# Patient Record
(demographics unavailable — no encounter records)

---

## 2025-05-05 NOTE — REVIEW OF SYSTEMS
[Negative] : Heme/Lymph [FreeTextEntry3] : History of elevated intraocular pressures. [FreeTextEntry5] : Hypercholesterolemia [FreeTextEntry7] : NKDA [de-identified] : Osteoarthritis [de-identified] : Spinal stenosis [de-identified] : Hypothyroid

## 2025-05-05 NOTE — REVIEW OF SYSTEMS
[Negative] : Heme/Lymph [FreeTextEntry3] : History of elevated intraocular pressures. [FreeTextEntry5] : Hypercholesterolemia [FreeTextEntry7] : NKDA [de-identified] : Osteoarthritis [de-identified] : Spinal stenosis [de-identified] : Hypothyroid

## 2025-05-05 NOTE — HISTORY OF PRESENT ILLNESS
[de-identified] : 84-year-old lady.  Tyrer-Cuzick risk score = 0.4.  Annual breast examination.  We have followed her for periodic breast examinations since 2007.    She has no signs or symptoms related to either breast.   + Prior personal history: September 2007: Excision of an enlarging right breast fibroadenoma Initial core biopsy was July 2006. No other procedures related to either breast.   No personal history of malignancy.   No relatives with breast OR ovarian cancer.  Not Ashkenazi  Family history of malignancy: Maternal aunt: Esophageal cancer in her late 50s. Maternal grandmother: Metastatic cancer of unknown primary. A sister had lung cancer. Brother had a malignancy, metastatic, from unknown primary.   Her menarche was at age 13.  She had 2 pregnancies, both delivered, first at age 18. Natural menopause at 50. No HRT.   Internist:  Dr. Paul LOMBARDI Prior physician retired: Cristóbal Snider.  NKDA.    She does not have a pacemaker or defibrillator. She takes no anticoagulants.  She takes simvastatin for hypercholesterolemia.  + Elevated intraocular pressure. Uses timolol eyedrops. Ophthalmology (every 6 months) Dr. Timothy RAMEY.  She takes levothyroxine for hypothyroidism. Does not see an endocrinologist.   GYN:  Dr. Anshu CARL, March 2024 visit was unremarkable.   She's had an exacerbation of her spinal stenosis, successfully treated with epidural injections by Dr. Florentin Bueno (pain management). She is planned to follow-up with him for management of worsening arthritis in her right knee (orthopedist is Dr. Naidu and does not think she needs joint replacement therapy at this time). She may try acupuncture.   colonoscopy: No longer necessary. She is >75 years old. She is asymptomatic that any previous personal or family history of CRC. Unknown

## 2025-05-05 NOTE — PHYSICAL EXAM
[Normal] : supple, no neck mass and thyroid not enlarged [Normal Neck Lymph Nodes] : normal neck lymph nodes  [Normal Supraclavicular Lymph Nodes] : normal supraclavicular lymph nodes [Normal Axillary Lymph Nodes] : normal axillary lymph nodes [Normal] : normal appearance, no rash, nodules, vesicles, ulcers, erythema [de-identified] : Groins not examined [de-identified] : below

## 2025-05-05 NOTE — PHYSICAL EXAM
[Normal] : supple, no neck mass and thyroid not enlarged [Normal Neck Lymph Nodes] : normal neck lymph nodes  [Normal Supraclavicular Lymph Nodes] : normal supraclavicular lymph nodes [Normal Axillary Lymph Nodes] : normal axillary lymph nodes [Normal] : normal appearance, no rash, nodules, vesicles, ulcers, erythema [de-identified] : Groins not examined [de-identified] : below

## 2025-05-05 NOTE — ASSESSMENT
[FreeTextEntry1] : 84-year-old lady.  Annual breast examination.  Tyrer-Cuzick risk score = 0.4%.  Clinically doing well.  0/17/25: Bilateral mammogram and sonogram at 450: BI-RADS 2. Prescriptions entered for April 2026.  If asymptomatic, continue yearly breast imaging, an annual follow-up examination.

## 2025-05-05 NOTE — HISTORY OF PRESENT ILLNESS
[de-identified] : 84-year-old lady.  Tyrer-Cuzick risk score = 0.4.  Annual breast examination.  We have followed her for periodic breast examinations since 2007.    She has no signs or symptoms related to either breast.   + Prior personal history: September 2007: Excision of an enlarging right breast fibroadenoma Initial core biopsy was July 2006. No other procedures related to either breast.   No personal history of malignancy.   No relatives with breast OR ovarian cancer.  Not Ashkenazi  Family history of malignancy: Maternal aunt: Esophageal cancer in her late 50s. Maternal grandmother: Metastatic cancer of unknown primary. A sister had lung cancer. Brother had a malignancy, metastatic, from unknown primary.   Her menarche was at age 13.  She had 2 pregnancies, both delivered, first at age 18. Natural menopause at 50. No HRT.   Internist:  Dr. Paul LOMBARDI Prior physician retired: Cristóbal Snider.  NKDA.    She does not have a pacemaker or defibrillator. She takes no anticoagulants.  She takes simvastatin for hypercholesterolemia.  + Elevated intraocular pressure. Uses timolol eyedrops. Ophthalmology (every 6 months) Dr. Timothy RAMEY.  She takes levothyroxine for hypothyroidism. Does not see an endocrinologist.   GYN:  Dr. Anshu CARL, March 2024 visit was unremarkable.   She's had an exacerbation of her spinal stenosis, successfully treated with epidural injections by Dr. Florentin Bueno (pain management). She is planned to follow-up with him for management of worsening arthritis in her right knee (orthopedist is Dr. Naidu and does not think she needs joint replacement therapy at this time). She may try acupuncture.   colonoscopy: No longer necessary. She is >75 years old. She is asymptomatic that any previous personal or family history of CRC.